# Patient Record
Sex: FEMALE | Race: BLACK OR AFRICAN AMERICAN | NOT HISPANIC OR LATINO | Employment: UNEMPLOYED | URBAN - METROPOLITAN AREA
[De-identification: names, ages, dates, MRNs, and addresses within clinical notes are randomized per-mention and may not be internally consistent; named-entity substitution may affect disease eponyms.]

---

## 2018-03-06 ENCOUNTER — OFFICE VISIT (OUTPATIENT)
Dept: URGENT CARE | Facility: CLINIC | Age: 68
End: 2018-03-06
Payer: MEDICARE

## 2018-03-06 VITALS
OXYGEN SATURATION: 97 % | DIASTOLIC BLOOD PRESSURE: 71 MMHG | TEMPERATURE: 98 F | WEIGHT: 208 LBS | SYSTOLIC BLOOD PRESSURE: 141 MMHG | HEART RATE: 78 BPM

## 2018-03-06 DIAGNOSIS — M54.9 BACK PAIN, UNSPECIFIED BACK LOCATION, UNSPECIFIED BACK PAIN LATERALITY, UNSPECIFIED CHRONICITY: Primary | ICD-10-CM

## 2018-03-06 PROCEDURE — 96372 THER/PROPH/DIAG INJ SC/IM: CPT | Mod: S$GLB,,, | Performed by: EMERGENCY MEDICINE

## 2018-03-06 PROCEDURE — 99203 OFFICE O/P NEW LOW 30 MIN: CPT | Mod: 25,S$GLB,, | Performed by: PHYSICIAN ASSISTANT

## 2018-03-06 RX ORDER — GABAPENTIN 100 MG/1
300 CAPSULE ORAL 3 TIMES DAILY
COMMUNITY
End: 2018-03-06 | Stop reason: SDUPTHER

## 2018-03-06 RX ORDER — DICLOFENAC SODIUM 10 MG/G
2 GEL TOPICAL DAILY
COMMUNITY

## 2018-03-06 RX ORDER — SIMVASTATIN 40 MG/1
40 TABLET, FILM COATED ORAL NIGHTLY
COMMUNITY
End: 2020-03-26 | Stop reason: ALTCHOICE

## 2018-03-06 RX ORDER — ZONISAMIDE 100 MG/1
CAPSULE ORAL
COMMUNITY

## 2018-03-06 RX ORDER — ALENDRONATE SODIUM 70 MG/1
70 TABLET ORAL
COMMUNITY

## 2018-03-06 RX ORDER — ASPIRIN 81 MG/1
81 TABLET ORAL DAILY
COMMUNITY

## 2018-03-06 RX ORDER — DICLOFENAC SODIUM 1 MG/ML
1 SOLUTION/ DROPS OPHTHALMIC 4 TIMES DAILY
COMMUNITY
End: 2020-03-26 | Stop reason: ALTCHOICE

## 2018-03-06 RX ORDER — GABAPENTIN 300 MG/1
300 CAPSULE ORAL 3 TIMES DAILY
Refills: 2 | COMMUNITY
Start: 2018-01-25

## 2018-03-06 RX ORDER — ESOMEPRAZOLE MAGNESIUM 40 MG/1
40 CAPSULE, DELAYED RELEASE ORAL
COMMUNITY

## 2018-03-06 RX ORDER — BENAZEPRIL HYDROCHLORIDE AND HYDROCHLOROTHIAZIDE 10; 12.5 MG/1; MG/1
1 TABLET ORAL DAILY
COMMUNITY
End: 2020-03-26 | Stop reason: ALTCHOICE

## 2018-03-06 RX ORDER — TROSPIUM CHLORIDE ER 60 MG/1
60 CAPSULE ORAL DAILY
COMMUNITY
End: 2020-03-26 | Stop reason: ALTCHOICE

## 2018-03-06 RX ORDER — HYDROCODONE BITARTRATE AND ACETAMINOPHEN 5; 325 MG/1; MG/1
1 TABLET ORAL EVERY 6 HOURS PRN
COMMUNITY

## 2018-03-06 RX ORDER — AZELASTINE HYDROCHLORIDE 0.5 MG/ML
1 SOLUTION/ DROPS OPHTHALMIC 2 TIMES DAILY
COMMUNITY

## 2018-03-06 NOTE — PROGRESS NOTES
Subjective:       Patient ID: Daksha Collins is a 67 y.o. female.    Vitals:  weight is 94.3 kg (208 lb). Her temperature is 97.7 °F (36.5 °C). Her blood pressure is 141/71 (abnormal) and her pulse is 78. Her oxygen saturation is 97%.     Chief Complaint: Back Pain    Patient is using OTC pain patches. Hydrocodone is not helping. Patient states that she picked up a gallon of water and pulled her back. Patient is allergic to tramadol.        Back Pain   This is a new problem. The current episode started in the past 7 days. The problem occurs constantly. The problem is unchanged. The pain is present in the lumbar spine and gluteal. The quality of the pain is described as stabbing. The pain does not radiate. The pain is at a severity of 8/10. The pain is moderate. The pain is worse during the night (pain woke patient up during the middle of the night). The symptoms are aggravated by bending. Pertinent negatives include no abdominal pain, chest pain, fever or headaches. She has tried analgesics, heat and ice for the symptoms. The treatment provided no relief.     Review of Systems   Constitution: Negative for chills and fever.   HENT: Negative for sore throat.    Eyes: Negative for blurred vision.   Cardiovascular: Negative for chest pain.   Respiratory: Negative for shortness of breath.    Skin: Negative for rash.   Musculoskeletal: Positive for back pain. Negative for joint pain.        Patient reports constant sharp, stabbing pain in lower lumber region. Patient reports pain in worse on the right side and radiates down her hip. She has an appointment with her PCP for pain management on 3/14/18. Patient was advised to come to urgent care to be treated since no earlier appointment could be offered.     Patient had [place] 9[place]   Gastrointestinal: Negative for abdominal pain, diarrhea, nausea and vomiting.   Neurological: Negative for headaches.   Psychiatric/Behavioral: The patient is not  nervous/anxious.        Objective:      Physical Exam   Constitutional: She is oriented to person, place, and time. Vital signs are normal. She appears well-developed and well-nourished. She is active and cooperative. No distress.   HENT:   Head: Normocephalic and atraumatic.   Nose: Nose normal.   Mouth/Throat: Oropharynx is clear and moist and mucous membranes are normal.   Eyes: Conjunctivae and lids are normal.   Neck: Trachea normal, normal range of motion, full passive range of motion without pain and phonation normal. Neck supple.   Cardiovascular: Normal rate, regular rhythm, normal heart sounds, intact distal pulses and normal pulses.    Pulmonary/Chest: Effort normal and breath sounds normal.   Abdominal: Soft. Normal appearance and bowel sounds are normal. She exhibits no abdominal bruit, no pulsatile midline mass and no mass.   Musculoskeletal: She exhibits no edema.        Lumbar back: She exhibits decreased range of motion, tenderness and pain. She exhibits no bony tenderness, no deformity and no laceration.        Back:    Neurological: She is alert and oriented to person, place, and time. She has normal strength and normal reflexes. No sensory deficit.   Skin: Skin is warm, dry and intact. She is not diaphoretic.   Psychiatric: She has a normal mood and affect. Her speech is normal and behavior is normal. Judgment and thought content normal. Cognition and memory are normal.   Nursing note and vitals reviewed.      Assessment:       1. Back pain, unspecified back location, unspecified back pain laterality, unspecified chronicity        Plan:         Back pain, unspecified back location, unspecified back pain laterality, unspecified chronicity  -     Discontinue: methylPREDNISolone sod suc(PF) injection 125 mg; Inject 125 mg into the vein one time.  -     methylPREDNISolone sod suc(PF) injection 125 mg; Inject 125 mg into the muscle once.        Please drink plenty of fluids.  Please get plenty of  rest.  Please return here or go to the Emergency Department for any concerns or worsening of condition.  If you were prescribed a narcotic medication, do not drive or operate heavy equipment or machinery while taking these medications.  If you were not prescribed an anti-inflammatory medication, and if you do not have any history of stomach/intestinal ulcers, or kidney disease, or are not taking a blood thinner such as Coumadin, Plavix, Pradaxa, Eloquis, or Xaralta for example, it is OK to take over the counter Ibuprofen or Advil or Motrin or Aleve as directed.  Do not take these medications on an empty stomach.  If you lose control of your bowel and/or bladder, please go to the nearest Emergency Department immediately.  If you lose sensation in between your legs by your genitalia and/or rectum, please go to the nearest Emergency Department immediately.  If you lose control or sensation of any extremity, please go to the nearest Emergency Department immediately.  Please follow up with your primary care doctor or specialist as needed.    If you  smoke, please stop smoking.        Back Pain (Acute or Chronic)    Back pain is one of the most common problems. The good news is that most people feel better in 1 to 2 weeks, and most of the rest in 1 to 2 months. Most people can remain active.  People experience and describe pain differently; not everyone is the same.  · The pain can be sharp, stabbing, shooting, aching, cramping or burning.  · Movement, standing, bending, lifting, sitting, or walking may worsen pain.  · It can be localized to one spot or area, or it can be more generalized.  · It can spread or radiate upwards, to the front, or go down your arms or legs (sciatica).  · It can cause muscle spasm.  Most of the time, mechanical problems with the muscles or spine cause the pain. Mechanical problems are usually caused by an injury to the muscles or ligaments. While illness can cause back pain, it is usually not  caused by a serious illness. Mechanical problems include:   · Physical activity such as sports, exercise, work, or normal activity  · Overexertion, lifting, pushing, pulling incorrectly or too aggressively  · Sudden twisting, bending, or stretching from an accident, or accidental movement  · Poor posture  · Stretching or moving wrong, without noticing pain at the time  · Poor coordination, lack of regular exercise (check with your doctor about this)  · Spinal disc disease or arthritis  · Stress  Pain can also be related to pregnancy, or illness like appendicitis, bladder or kidney infections, pelvic infections, and many other things.  Acute back pain usually gets better in 1 to 2 weeks. Back pain related to disk disease, arthritis in the spinal joints or spinal stenosis (narrowing of the spinal canal) can become chronic and last for months or years.  Unless you had a physical injury (for example, a car accident or fall) X-rays are usually not needed for the initial evaluation of back pain. If pain continues and does not respond to medical treatment, X-rays and other tests may be needed.  Home care  Try these home care recommendations:  · When in bed, try to find a position of comfort. A firm mattress is best. Try lying flat on your back with pillows under your knees. You can also try lying on your side with your knees bent up towards your chest and a pillow between your knees.  · At first, do not try to stretch out the sore spots. If there is a strain, it is not like the good soreness you get after exercising without an injury. In this case, stretching may make it worse.  · Avoid prolong sitting, long car rides, or travel. This puts more stress on the lower back than standing or walking.  · During the first 24 to 72 hours after an acute injury or flare up of chronic back pain, apply an ice pack to the painful area for 20 minutes and then remove it for 20 minutes. Do this over a period of 60 to 90 minutes or several  times a day. This will reduce swelling and pain. Wrap the ice pack in a thin towel or plastic to protect your skin.  · You can start with ice, then switch to heat. Heat (hot shower, hot bath, or heating pad) reduces pain and works well for muscle spasms. Heat can be applied to the painful area for 20 minutes then remove it for 20 minutes. Do this over a period of 60 to 90 minutes or several times a day. Do not sleep on a heating pad. It can lead to skin burns or tissue damage.  · You can alternate ice and heat therapy. Talk with your doctor about the best treatment for your back pain.  · Therapeutic massage can help relax the back muscles without stretching them.  · Be aware of safe lifting methods and do not lift anything without stretching first.  Medicines  Talk to your doctor before using medicine, especially if you have other medical problems or are taking other medicines.  · You may use over-the-counter medicine as directed on the bottle to control pain, unless another pain medicine was prescribed. If you have chronic conditions like diabetes, liver or kidney disease, stomach ulcers, or gastrointestinal bleeding, or are taking blood thinners, talk to your doctor before taking any medicine.  · Be careful if you are given a prescription medicines, narcotics, or medicine for muscle spasms. They can cause drowsiness, affect your coordination, reflexes, and judgement. Do not drive or operate heavy machinery.  Follow-up care  Follow up with your healthcare provider, or as advised.   A radiologist will review any X-rays that were taken. Your provide will notify you of any new findings that may affect your care.  Call 911  Call emergency services if any of the following occur:  · Trouble breathing  · Confusion  · Very drowsy or trouble awakening  · Fainting or loss of consciousness  · Rapid or very slow heart rate  · Loss of bowel or bladder control  When to seek medical advice  Call your healthcare provider right  away if any of these occur:   · Pain becomes worse or spreads to your legs  · Weakness or numbness in one or both legs  · Numbness in the groin or genital area  Date Last Reviewed: 7/1/2016 © 2000-2017 [employer]. [address] [place] [id number]. All rights reserved. This information is not intended as a substitute for professional medical care. Always follow your healthcare professional's instructions.        Relieving Back Pain  Back pain is a common problem. You can strain back muscles by lifting too much weight or just by moving the wrong way. Back strain can be uncomfortable, even painful. And it can take weeks or months to improve. To help yourself feel better and prevent future back strains, try these tips.  Important Note: Do not give aspirin to children or teens without first discussing it with your healthcare provider.      ? Ice    Ice reduces muscle pain and swelling. It helps most during the first 24 to 48 hours after an injury.  · Wrap an ice pack or a bag of frozen peas in a thin towel. (Never place ice directly on your skin.)  · Place the ice where your back hurts the most.  · Dont ice for more than 20 minutes at a time.  · You can use ice several times a day.  ? Medicines  Over-the-counter pain relievers can include acetaminophen and anti-inflammatory medicines, which includes aspirin or ibuprofen. They can help ease discomfort. Some also reduce swelling.  · Tell your healthcare provider about any medicines you are already taking.  · Take medicines only as directed.  ? Heat  After the first 48 hours, heat can relax sore muscles and improve blood flow.  · Try a warm bath or shower. Or use a heating pad set on low. To prevent a burn, keep a cloth between you and the heating pad.  · Dont use a heating pad for more than 15 minutes at a time. Never sleep on a heating pad.  Date Last Reviewed: 9/1/2015  © 7770-0324 [employer]. [address] [place] [address]  [address]. All rights reserved. This information is not intended as a substitute for professional medical care. Always follow your healthcare professional's instructions.

## 2018-03-06 NOTE — PATIENT INSTRUCTIONS
Please drink plenty of fluids.  Please get plenty of rest.  Please return here or go to the Emergency Department for any concerns or worsening of condition.  If you were prescribed a narcotic medication, do not drive or operate heavy equipment or machinery while taking these medications.  If you were not prescribed an anti-inflammatory medication, and if you do not have any history of stomach/intestinal ulcers, or kidney disease, or are not taking a blood thinner such as Coumadin, Plavix, Pradaxa, Eloquis, or Xaralta for example, it is OK to take over the counter Ibuprofen or Advil or Motrin or Aleve as directed.  Do not take these medications on an empty stomach.  If you lose control of your bowel and/or bladder, please go to the nearest Emergency Department immediately.  If you lose sensation in between your legs by your genitalia and/or rectum, please go to the nearest Emergency Department immediately.  If you lose control or sensation of any extremity, please go to the nearest Emergency Department immediately.  Please follow up with your primary care doctor or specialist as needed.    If you  smoke, please stop smoking.        Back Pain (Acute or Chronic)    Back pain is one of the most common problems. The good news is that most people feel better in 1 to 2 weeks, and most of the rest in 1 to 2 months. Most people can remain active.  People experience and describe pain differently; not everyone is the same.  · The pain can be sharp, stabbing, shooting, aching, cramping or burning.  · Movement, standing, bending, lifting, sitting, or walking may worsen pain.  · It can be localized to one spot or area, or it can be more generalized.  · It can spread or radiate upwards, to the front, or go down your arms or legs (sciatica).  · It can cause muscle spasm.  Most of the time, mechanical problems with the muscles or spine cause the pain. Mechanical problems are usually caused by an injury to the muscles or ligaments.  While illness can cause back pain, it is usually not caused by a serious illness. Mechanical problems include:   · Physical activity such as sports, exercise, work, or normal activity  · Overexertion, lifting, pushing, pulling incorrectly or too aggressively  · Sudden twisting, bending, or stretching from an accident, or accidental movement  · Poor posture  · Stretching or moving wrong, without noticing pain at the time  · Poor coordination, lack of regular exercise (check with your doctor about this)  · Spinal disc disease or arthritis  · Stress  Pain can also be related to pregnancy, or illness like appendicitis, bladder or kidney infections, pelvic infections, and many other things.  Acute back pain usually gets better in 1 to 2 weeks. Back pain related to disk disease, arthritis in the spinal joints or spinal stenosis (narrowing of the spinal canal) can become chronic and last for months or years.  Unless you had a physical injury (for example, a car accident or fall) X-rays are usually not needed for the initial evaluation of back pain. If pain continues and does not respond to medical treatment, X-rays and other tests may be needed.  Home care  Try these home care recommendations:  · When in bed, try to find a position of comfort. A firm mattress is best. Try lying flat on your back with pillows under your knees. You can also try lying on your side with your knees bent up towards your chest and a pillow between your knees.  · At first, do not try to stretch out the sore spots. If there is a strain, it is not like the good soreness you get after exercising without an injury. In this case, stretching may make it worse.  · Avoid prolong sitting, long car rides, or travel. This puts more stress on the lower back than standing or walking.  · During the first 24 to 72 hours after an acute injury or flare up of chronic back pain, apply an ice pack to the painful area for 20 minutes and then remove it for 20 minutes.  Do this over a period of 60 to 90 minutes or several times a day. This will reduce swelling and pain. Wrap the ice pack in a thin towel or plastic to protect your skin.  · You can start with ice, then switch to heat. Heat (hot shower, hot bath, or heating pad) reduces pain and works well for muscle spasms. Heat can be applied to the painful area for 20 minutes then remove it for 20 minutes. Do this over a period of 60 to 90 minutes or several times a day. Do not sleep on a heating pad. It can lead to skin burns or tissue damage.  · You can alternate ice and heat therapy. Talk with your doctor about the best treatment for your back pain.  · Therapeutic massage can help relax the back muscles without stretching them.  · Be aware of safe lifting methods and do not lift anything without stretching first.  Medicines  Talk to your doctor before using medicine, especially if you have other medical problems or are taking other medicines.  · You may use over-the-counter medicine as directed on the bottle to control pain, unless another pain medicine was prescribed. If you have chronic conditions like diabetes, liver or kidney disease, stomach ulcers, or gastrointestinal bleeding, or are taking blood thinners, talk to your doctor before taking any medicine.  · Be careful if you are given a prescription medicines, narcotics, or medicine for muscle spasms. They can cause drowsiness, affect your coordination, reflexes, and judgement. Do not drive or operate heavy machinery.  Follow-up care  Follow up with your healthcare provider, or as advised.   A radiologist will review any X-rays that were taken. Your provide will notify you of any new findings that may affect your care.  Call 911  Call emergency services if any of the following occur:  · Trouble breathing  · Confusion  · Very drowsy or trouble awakening  · Fainting or loss of consciousness  · Rapid or very slow heart rate  · Loss of bowel or bladder control  When to seek  medical advice  Call your healthcare provider right away if any of these occur:   · Pain becomes worse or spreads to your legs  · Weakness or numbness in one or both legs  · Numbness in the groin or genital area  Date Last Reviewed: 7/1/2016 © 2000-2017 [employer]. [address] [place] [id number]. All rights reserved. This information is not intended as a substitute for professional medical care. Always follow your healthcare professional's instructions.        Relieving Back Pain  Back pain is a common problem. You can strain back muscles by lifting too much weight or just by moving the wrong way. Back strain can be uncomfortable, even painful. And it can take weeks or months to improve. To help yourself feel better and prevent future back strains, try these tips.  Important Note: Do not give aspirin to children or teens without first discussing it with your healthcare provider.      ? Ice    Ice reduces muscle pain and swelling. It helps most during the first 24 to 48 hours after an injury.  · Wrap an ice pack or a bag of frozen peas in a thin towel. (Never place ice directly on your skin.)  · Place the ice where your back hurts the most.  · Dont ice for more than 20 minutes at a time.  · You can use ice several times a day.  ? Medicines  Over-the-counter pain relievers can include acetaminophen and anti-inflammatory medicines, which includes aspirin or ibuprofen. They can help ease discomfort. Some also reduce swelling.  · Tell your healthcare provider about any medicines you are already taking.  · Take medicines only as directed.  ? Heat  After the first 48 hours, heat can relax sore muscles and improve blood flow.  · Try a warm bath or shower. Or use a heating pad set on low. To prevent a burn, keep a cloth between you and the heating pad.  · Dont use a heating pad for more than 15 minutes at a time. Never sleep on a heating pad.  Date Last Reviewed: 9/1/2015 © 2000-2017 [employer]  [employer]. [address] [place] [address], [place] [address]. All rights reserved. This information is not intended as a substitute for professional medical care. Always follow your healthcare professional's instructions.

## 2018-03-09 ENCOUNTER — TELEPHONE (OUTPATIENT)
Dept: URGENT CARE | Facility: CLINIC | Age: 68
End: 2018-03-09

## 2020-03-26 ENCOUNTER — OFFICE VISIT (OUTPATIENT)
Dept: URGENT CARE | Facility: CLINIC | Age: 70
End: 2020-03-26
Payer: MEDICARE

## 2020-03-26 VITALS
RESPIRATION RATE: 18 BRPM | DIASTOLIC BLOOD PRESSURE: 90 MMHG | OXYGEN SATURATION: 98 % | SYSTOLIC BLOOD PRESSURE: 140 MMHG | TEMPERATURE: 98 F | HEART RATE: 80 BPM

## 2020-03-26 DIAGNOSIS — Z78.9: ICD-10-CM

## 2020-03-26 DIAGNOSIS — B85.1 BODY LICE: ICD-10-CM

## 2020-03-26 DIAGNOSIS — B85.0 HEAD LICE: Primary | ICD-10-CM

## 2020-03-26 PROCEDURE — 99214 OFFICE O/P EST MOD 30 MIN: CPT | Mod: S$GLB,,, | Performed by: FAMILY MEDICINE

## 2020-03-26 PROCEDURE — 99214 PR OFFICE/OUTPT VISIT, EST, LEVL IV, 30-39 MIN: ICD-10-PCS | Mod: S$GLB,,, | Performed by: FAMILY MEDICINE

## 2020-03-26 RX ORDER — HYDROCHLOROTHIAZIDE 12.5 MG/1
12.5 TABLET ORAL DAILY
COMMUNITY

## 2020-03-26 NOTE — PATIENT INSTRUCTIONS
Head Lice     Head lice can spread quickly in schools and  centers.     Lice are very tiny insects. They like to live in hair, so they are often called head lice. An infection with head lice is very common in school-age children, but anyone can get lice. Head lice do not live on pets and cannot jump, fly, or walk on the ground. But they easily pass from child to child through close contact and on clothes, bed linens, brushes and arias, hats, and toys. Head lice infections are not dangerous, but they can be difficult to treat sometimes. They are very contagious and should be treated right away to stop infection from spreading.  Symptoms of Head Lice  Lice are so small and fast-moving that they are hard to see. Head lice lay eggs, called nits. Nits are very small, silvery white, and teardrop shaped. They often can be found stuck to the hair near the scalp, behind the ears, and at the hairline on the back of the neck. Other signs of head lice may include:  · Itching of the scalp and scalp irritation.  · A tickling feeling of something moving through the hair.  · Sores on the scalp caused by scratching.  · Swollen glands at the back of the neck caused by infected bites.  Treating Head Lice  · Ask your healthcare provider or pharmacist to recommend a shampoo, cream, or lotion to stop lice infestation. Follow the instructions on the product for how to use it.  · Comb the nits out of your childs hair with a special comb that comes with the product or is recommended by your healthcare provider or pharmacist. Rinsing the hair with distilled white vinegar can make nits easier to see.  · After a week, check the child for more nits. Follow the products directions and ask your healthcare provider about the need for repeating treatment.  · Check other household members for lice.  · Wash your childs towels, clothing, bed linens, cloth toys, and other personal items in hot soapy water. Dry them on high heat.  · Wash  all the childs arias and brushes in very hot, soapy water.  · For items that cant be washed, seal them in plastic bags for 2 weeks.  · Vacuum floors and furniture. Throw the vacuum bag away afterward.  · Notify your childs school and caregivers so that other children can be checked.  · Keep your child home from  or school until the morning after treatment for lice.  · Do not spray your house with chemicals or pesticides. These can be dangerous to your familys health.  When to call the healthcare provider  Do not treat your child for head lice unless you are sure the child has them. Because lice are insects, most products to get rid of them have pesticides in them. You should not expose your child to these chemicals unless it is necessary since they can cause skin and eye irritation. Call your childs healthcare provider if:  · Youre not sure whether your child has lice.  · Your child is younger than age 2.  · Treatment doesnt get rid of the lice.  · Your child has infected sores that get worse or dont heal.  · Your child is itchy or scratching in areas other than the scalp.  · You have questions about your childs illness or treatment.  Prevention  To help prevent the spread of head lice:  · Warn your children not to share brushes, hats, and clothes with other children.  · Have your child avoid physical contact with anyone who has head lice until after the person has been treated.  · Examine your child when he or she has come in close contact with a person infected with lice.  Note: It may take up to a week after treatment for your childs itching to stop. Your healthcare provider may recommend that you repeat treatment a week later, but your child can return to school or  the day after treatment.   Date Last Reviewed: 8/1/2016  © 3437-6039 [employer]. [address] [place] [address], [place] [address]. All rights reserved. This information is not intended as a substitute for professional  medical care. Always follow your healthcare professional's instructions.        Head Lice     Head lice can spread quickly in schools and  centers.     Lice are very tiny insects. They like to live in hair, so they are often called head lice. An infection with head lice is very common in school-age children, but anyone can get lice. Head lice do not live on pets and cannot jump, fly, or walk on the ground. But they easily pass from child to child through close contact and on clothes, bed linens, brushes and arias, hats, and toys. Head lice infections are not dangerous, but they can be difficult to treat sometimes. They are very contagious and should be treated right away to stop infection from spreading.  Symptoms of Head Lice  Lice are so small and fast-moving that they are hard to see. Head lice lay eggs, called nits. Nits are very small, silvery white, and teardrop shaped. They often can be found stuck to the hair near the scalp, behind the ears, and at the hairline on the back of the neck. Other signs of head lice may include:  · Itching of the scalp and scalp irritation.  · A tickling feeling of something moving through the hair.  · Sores on the scalp caused by scratching.  · Swollen glands at the back of the neck caused by infected bites.  Treating Head Lice  · Ask your healthcare provider or pharmacist to recommend a shampoo, cream, or lotion to stop lice infestation. Follow the instructions on the product for how to use it.  · Comb the nits out of your childs hair with a special comb that comes with the product or is recommended by your healthcare provider or pharmacist. Rinsing the hair with distilled white vinegar can make nits easier to see.  · After a week, check the child for more nits. Follow the products directions and ask your healthcare provider about the need for repeating treatment.  · Check other household members for lice.  · Wash your childs towels, clothing, bed linens, cloth toys,  and other personal items in hot soapy water. Dry them on high heat.  · Wash all the childs arias and brushes in very hot, soapy water.  · For items that cant be washed, seal them in plastic bags for 2 weeks.  · Vacuum floors and furniture. Throw the vacuum bag away afterward.  · Notify your childs school and caregivers so that other children can be checked.  · Keep your child home from  or school until the morning after treatment for lice.  · Do not spray your house with chemicals or pesticides. These can be dangerous to your familys health.  When to call the healthcare provider  Do not treat your child for head lice unless you are sure the child has them. Because lice are insects, most products to get rid of them have pesticides in them. You should not expose your child to these chemicals unless it is necessary since they can cause skin and eye irritation. Call your childs healthcare provider if:  · Youre not sure whether your child has lice.  · Your child is younger than age 2.  · Treatment doesnt get rid of the lice.  · Your child has infected sores that get worse or dont heal.  · Your child is itchy or scratching in areas other than the scalp.  · You have questions about your childs illness or treatment.  Prevention  To help prevent the spread of head lice:  · Warn your children not to share brushes, hats, and clothes with other children.  · Have your child avoid physical contact with anyone who has head lice until after the person has been treated.  · Examine your child when he or she has come in close contact with a person infected with lice.  Note: It may take up to a week after treatment for your childs itching to stop. Your healthcare provider may recommend that you repeat treatment a week later, but your child can return to school or  the day after treatment.   Date Last Reviewed: 8/1/2016  © 7614-1810 [employer]. [address] [place] [address], [place] [address]. All rights  reserved. This information is not intended as a substitute for professional medical care. Always follow your healthcare professional's instructions.

## 2020-03-26 NOTE — PROGRESS NOTES
"Subjective:       Patient ID: Daksha Collins is a 69 y.o. female.    Vitals:  tympanic temperature is 97.9 °F (36.6 °C). Her blood pressure is 140/90 (abnormal) and her pulse is 80. Her respiration is 18 and oxygen saturation is 98%.     Chief Complaint: Insect Bite    Patient presents with c.o bug bites for 3 weeks.   Patient brought in a container full of small objects (unsure if they are bugs).     Patient states she itches all over. No meds tried otc.    Denies sob, fever, no recent travel and no known exp to corona. Patient reports a chronic cough for the last year which her doctor has given her many inhalers to manage symptom.   She has had the maintenance people come to her apartment where she states the bugs are "flying around"        Insect Bite   This is a new problem. Episode onset: 3 weeks. The problem occurs constantly. Associated symptoms include a rash. Pertinent negatives include no arthralgias, chills, coughing, fever, joint swelling or sore throat. Nothing aggravates the symptoms. She has tried nothing for the symptoms.       Constitution: Negative for chills and fever.   HENT: Negative for facial swelling and sore throat.    Neck: Negative for painful lymph nodes.   Eyes: Negative for eye itching and eyelid swelling.   Respiratory: Negative for cough.    Musculoskeletal: Negative for joint pain and joint swelling.   Skin: Positive for rash. Negative for color change, pale, wound, abrasion, laceration, lesion, skin thickening/induration, puncture wound, erythema, bruising, abscess, avulsion and hives.   Allergic/Immunologic: Positive for itching. Negative for environmental allergies, immunocompromised state and hives.   Hematologic/Lymphatic: Negative for swollen lymph nodes.       Objective:      Physical Exam   Constitutional: She is oriented to person, place, and time. She appears well-developed and well-nourished.   Neurological: She is alert and oriented to person, place, and time.   Skin: " "Skin is warm and dry.   No obvious excoriations.   Pt has small white flex in her scalp -? Head lice.    the one white bug she points out on her walker -chair she is able to  and setdown on a table and   Crunch" with her magnifying mirror erythema  Psychiatric: She has a normal mood and affect.   Nursing note and vitals reviewed.        Assessment:       1. Head lice    2. Body lice    3. Unknown if patient has history of psychiatric disorder        Plan:         Head lice  -     Discontinue: permethrin (LICE TREATMENT, PERMETHRIN,) 1 % liquid; Apply topically once. for 1 dose; Refill: 0  -     permethrin (LICE TREATMENT, PERMETHRIN,) 1 % liquid; Apply topically once. for 1 dose; Refill: 0    Body lice  -     crotamiton (CROTAN) 10 % cream; Apply topically once daily.  Dispense: 60 g; Refill: 1    Unknown if patient has history of psychiatric disorder    Other orders  -     Discontinue: crotamiton (CROTAN) 10 % cream; Apply topically once daily.  Dispense: 60 g; Refill: 1       Patient Instructions       Head Lice     Head lice can spread quickly in schools and  centers.     Lice are very tiny insects. They like to live in hair, so they are often called head lice. An infection with head lice is very common in school-age children, but anyone can get lice. Head lice do not live on pets and cannot jump, fly, or walk on the ground. But they easily pass from child to child through close contact and on clothes, bed linens, brushes and arias, hats, and toys. Head lice infections are not dangerous, but they can be difficult to treat sometimes. They are very contagious and should be treated right away to stop infection from spreading.  Symptoms of Head Lice  Lice are so small and fast-moving that they are hard to see. Head lice lay eggs, called nits. Nits are very small, silvery white, and teardrop shaped. They often can be found stuck to the hair near the scalp, behind the ears, and at the hairline on the " back of the neck. Other signs of head lice may include:  · Itching of the scalp and scalp irritation.  · A tickling feeling of something moving through the hair.  · Sores on the scalp caused by scratching.  · Swollen glands at the back of the neck caused by infected bites.  Treating Head Lice  · Ask your healthcare provider or pharmacist to recommend a shampoo, cream, or lotion to stop lice infestation. Follow the instructions on the product for how to use it.  · Comb the nits out of your childs hair with a special comb that comes with the product or is recommended by your healthcare provider or pharmacist. Rinsing the hair with distilled white vinegar can make nits easier to see.  · After a week, check the child for more nits. Follow the products directions and ask your healthcare provider about the need for repeating treatment.  · Check other household members for lice.  · Wash your childs towels, clothing, bed linens, cloth toys, and other personal items in hot soapy water. Dry them on high heat.  · Wash all the childs arias and brushes in very hot, soapy water.  · For items that cant be washed, seal them in plastic bags for 2 weeks.  · Vacuum floors and furniture. Throw the vacuum bag away afterward.  · Notify your childs school and caregivers so that other children can be checked.  · Keep your child home from  or school until the morning after treatment for lice.  · Do not spray your house with chemicals or pesticides. These can be dangerous to your familys health.  When to call the healthcare provider  Do not treat your child for head lice unless you are sure the child has them. Because lice are insects, most products to get rid of them have pesticides in them. You should not expose your child to these chemicals unless it is necessary since they can cause skin and eye irritation. Call your childs healthcare provider if:  · Youre not sure whether your child has lice.  · Your child is younger  than age 2.  · Treatment doesnt get rid of the lice.  · Your child has infected sores that get worse or dont heal.  · Your child is itchy or scratching in areas other than the scalp.  · You have questions about your childs illness or treatment.  Prevention  To help prevent the spread of head lice:  · Warn your children not to share brushes, hats, and clothes with other children.  · Have your child avoid physical contact with anyone who has head lice until after the person has been treated.  · Examine your child when he or she has come in close contact with a person infected with lice.  Note: It may take up to a week after treatment for your childs itching to stop. Your healthcare provider may recommend that you repeat treatment a week later, but your child can return to school or  the day after treatment.   Date Last Reviewed: 8/1/2016  © 1282-9239 [employer]. [address] [place] [address]. All rights reserved. This information is not intended as a substitute for professional medical care. Always follow your healthcare professional's instructions.        Head Lice     Head lice can spread quickly in schools and  centers.     Lice are very tiny insects. They like to live in hair, so they are often called head lice. An infection with head lice is very common in school-age children, but anyone can get lice. Head lice do not live on pets and cannot jump, fly, or walk on the ground. But they easily pass from child to child through close contact and on clothes, bed linens, brushes and arias, hats, and toys. Head lice infections are not dangerous, but they can be difficult to treat sometimes. They are very contagious and should be treated right away to stop infection from spreading.  Symptoms of Head Lice  Lice are so small and fast-moving that they are hard to see. Head lice lay eggs, called nits. Nits are very small, silvery white, and teardrop shaped. They often can be  found stuck to the hair near the scalp, behind the ears, and at the hairline on the back of the neck. Other signs of head lice may include:  · Itching of the scalp and scalp irritation.  · A tickling feeling of something moving through the hair.  · Sores on the scalp caused by scratching.  · Swollen glands at the back of the neck caused by infected bites.  Treating Head Lice  · Ask your healthcare provider or pharmacist to recommend a shampoo, cream, or lotion to stop lice infestation. Follow the instructions on the product for how to use it.  · Comb the nits out of your childs hair with a special comb that comes with the product or is recommended by your healthcare provider or pharmacist. Rinsing the hair with distilled white vinegar can make nits easier to see.  · After a week, check the child for more nits. Follow the products directions and ask your healthcare provider about the need for repeating treatment.  · Check other household members for lice.  · Wash your childs towels, clothing, bed linens, cloth toys, and other personal items in hot soapy water. Dry them on high heat.  · Wash all the childs arias and brushes in very hot, soapy water.  · For items that cant be washed, seal them in plastic bags for 2 weeks.  · Vacuum floors and furniture. Throw the vacuum bag away afterward.  · Notify your childs school and caregivers so that other children can be checked.  · Keep your child home from  or school until the morning after treatment for lice.  · Do not spray your house with chemicals or pesticides. These can be dangerous to your familys health.  When to call the healthcare provider  Do not treat your child for head lice unless you are sure the child has them. Because lice are insects, most products to get rid of them have pesticides in them. You should not expose your child to these chemicals unless it is necessary since they can cause skin and eye irritation. Call your childs healthcare  provider if:  · Youre not sure whether your child has lice.  · Your child is younger than age 2.  · Treatment doesnt get rid of the lice.  · Your child has infected sores that get worse or dont heal.  · Your child is itchy or scratching in areas other than the scalp.  · You have questions about your childs illness or treatment.  Prevention  To help prevent the spread of head lice:  · Warn your children not to share brushes, hats, and clothes with other children.  · Have your child avoid physical contact with anyone who has head lice until after the person has been treated.  · Examine your child when he or she has come in close contact with a person infected with lice.  Note: It may take up to a week after treatment for your childs itching to stop. Your healthcare provider may recommend that you repeat treatment a week later, but your child can return to school or  the day after treatment.   Date Last Reviewed: 8/1/2016 © 2000-2017 [employer]. [address] [place] [address], [place] [address]. All rights reserved. This information is not intended as a substitute for professional medical care. Always follow your healthcare professional's instructions.

## 2020-04-02 ENCOUNTER — TELEPHONE (OUTPATIENT)
Dept: URGENT CARE | Facility: CLINIC | Age: 70
End: 2020-04-02

## 2020-04-02 DIAGNOSIS — B85.1 BODY LICE: Primary | ICD-10-CM

## 2020-04-02 RX ORDER — PERMETHRIN 50 MG/G
CREAM TOPICAL ONCE
Qty: 60 G | Refills: 0 | Status: SHIPPED | OUTPATIENT
Start: 2020-04-02 | End: 2020-04-02

## 2020-04-02 NOTE — TELEPHONE ENCOUNTER
Patient called stating that the pharmacy doesn't have her medications. States that she was seen last week and told to get Nix and her Rx. She got her Nix but she went to the pharmacy and they said they were unable to order her medication (crotamiton). Requesting alternative treatment. Permethrin 5% lotion sent to pharmacy.

## 2020-04-06 ENCOUNTER — TELEPHONE (OUTPATIENT)
Dept: URGENT CARE | Facility: CLINIC | Age: 70
End: 2020-04-06

## 2020-04-06 ENCOUNTER — HOSPITAL ENCOUNTER (EMERGENCY)
Facility: HOSPITAL | Age: 70
Discharge: PSYCHIATRIC OR SUBSTANCE USE FACILITY | End: 2020-04-07
Attending: EMERGENCY MEDICINE
Payer: MEDICARE

## 2020-04-06 DIAGNOSIS — F22: ICD-10-CM

## 2020-04-06 DIAGNOSIS — R45.851 SUICIDAL IDEATIONS: Primary | ICD-10-CM

## 2020-04-06 LAB
ALBUMIN SERPL BCP-MCNC: 4 G/DL (ref 3.5–5.2)
ALP SERPL-CCNC: 74 U/L (ref 55–135)
ALT SERPL W/O P-5'-P-CCNC: 25 U/L (ref 10–44)
AMORPH CRY UR QL COMP ASSIST: ABNORMAL
AMPHET+METHAMPHET UR QL: NEGATIVE
ANION GAP SERPL CALC-SCNC: 9 MMOL/L (ref 8–16)
APAP SERPL-MCNC: 4 UG/ML (ref 10–20)
AST SERPL-CCNC: 34 U/L (ref 10–40)
BACTERIA #/AREA URNS AUTO: ABNORMAL /HPF
BARBITURATES UR QL SCN>200 NG/ML: NEGATIVE
BASOPHILS # BLD AUTO: 0.03 K/UL (ref 0–0.2)
BASOPHILS NFR BLD: 0.7 % (ref 0–1.9)
BENZODIAZ UR QL SCN>200 NG/ML: NEGATIVE
BILIRUB SERPL-MCNC: 0.3 MG/DL (ref 0.1–1)
BILIRUB UR QL STRIP: NEGATIVE
BUN SERPL-MCNC: 10 MG/DL (ref 8–23)
BZE UR QL SCN: NEGATIVE
CALCIUM SERPL-MCNC: 9.9 MG/DL (ref 8.7–10.5)
CANNABINOIDS UR QL SCN: NEGATIVE
CAOX CRY UR QL COMP ASSIST: ABNORMAL
CHLORIDE SERPL-SCNC: 103 MMOL/L (ref 95–110)
CLARITY UR REFRACT.AUTO: ABNORMAL
CO2 SERPL-SCNC: 29 MMOL/L (ref 23–29)
COLOR UR AUTO: ABNORMAL
CREAT SERPL-MCNC: 0.7 MG/DL (ref 0.5–1.4)
CREAT UR-MCNC: 118 MG/DL (ref 15–325)
DIFFERENTIAL METHOD: ABNORMAL
EOSINOPHIL # BLD AUTO: 0.1 K/UL (ref 0–0.5)
EOSINOPHIL NFR BLD: 3.4 % (ref 0–8)
ERYTHROCYTE [DISTWIDTH] IN BLOOD BY AUTOMATED COUNT: 13 % (ref 11.5–14.5)
EST. GFR  (AFRICAN AMERICAN): >60 ML/MIN/1.73 M^2
EST. GFR  (NON AFRICAN AMERICAN): >60 ML/MIN/1.73 M^2
ETHANOL SERPL-MCNC: <10 MG/DL
GLUCOSE SERPL-MCNC: 115 MG/DL (ref 70–110)
GLUCOSE UR QL STRIP: NEGATIVE
HCT VFR BLD AUTO: 46.3 % (ref 37–48.5)
HGB BLD-MCNC: 14.1 G/DL (ref 12–16)
HGB UR QL STRIP: NEGATIVE
IMM GRANULOCYTES # BLD AUTO: 0.01 K/UL (ref 0–0.04)
IMM GRANULOCYTES NFR BLD AUTO: 0.2 % (ref 0–0.5)
KETONES UR QL STRIP: NEGATIVE
LEUKOCYTE ESTERASE UR QL STRIP: ABNORMAL
LYMPHOCYTES # BLD AUTO: 1.7 K/UL (ref 1–4.8)
LYMPHOCYTES NFR BLD: 41.8 % (ref 18–48)
MCH RBC QN AUTO: 27.5 PG (ref 27–31)
MCHC RBC AUTO-ENTMCNC: 30.5 G/DL (ref 32–36)
MCV RBC AUTO: 90 FL (ref 82–98)
METHADONE UR QL SCN>300 NG/ML: NEGATIVE
MICROSCOPIC COMMENT: ABNORMAL
MONOCYTES # BLD AUTO: 0.4 K/UL (ref 0.3–1)
MONOCYTES NFR BLD: 8.7 % (ref 4–15)
NEUTROPHILS # BLD AUTO: 1.9 K/UL (ref 1.8–7.7)
NEUTROPHILS NFR BLD: 45.2 % (ref 38–73)
NITRITE UR QL STRIP: NEGATIVE
NRBC BLD-RTO: 0 /100 WBC
OPIATES UR QL SCN: NORMAL
PCP UR QL SCN>25 NG/ML: NEGATIVE
PH UR STRIP: 7 [PH] (ref 5–8)
PLATELET # BLD AUTO: 240 K/UL (ref 150–350)
PMV BLD AUTO: 10.7 FL (ref 9.2–12.9)
POTASSIUM SERPL-SCNC: 3.6 MMOL/L (ref 3.5–5.1)
PROT SERPL-MCNC: 8.1 G/DL (ref 6–8.4)
PROT UR QL STRIP: NEGATIVE
RBC # BLD AUTO: 5.12 M/UL (ref 4–5.4)
SALICYLATES SERPL-MCNC: <5 MG/DL (ref 15–30)
SODIUM SERPL-SCNC: 141 MMOL/L (ref 136–145)
SP GR UR STRIP: 1.02 (ref 1–1.03)
SQUAMOUS #/AREA URNS AUTO: 4 /HPF
TOXICOLOGY INFORMATION: NORMAL
TSH SERPL DL<=0.005 MIU/L-ACNC: 3.2 UIU/ML (ref 0.4–4)
URN SPEC COLLECT METH UR: ABNORMAL
WBC # BLD AUTO: 4.14 K/UL (ref 3.9–12.7)
WBC #/AREA URNS AUTO: 5 /HPF (ref 0–5)

## 2020-04-06 PROCEDURE — 63600175 PHARM REV CODE 636 W HCPCS: Performed by: PHYSICIAN ASSISTANT

## 2020-04-06 PROCEDURE — 99285 PR EMERGENCY DEPT VISIT,LEVEL V: ICD-10-PCS | Mod: ,,, | Performed by: PHYSICIAN ASSISTANT

## 2020-04-06 PROCEDURE — 63600175 PHARM REV CODE 636 W HCPCS: Performed by: EMERGENCY MEDICINE

## 2020-04-06 PROCEDURE — 80320 DRUG SCREEN QUANTALCOHOLS: CPT

## 2020-04-06 PROCEDURE — 99285 EMERGENCY DEPT VISIT HI MDM: CPT | Mod: 25

## 2020-04-06 PROCEDURE — 81001 URINALYSIS AUTO W/SCOPE: CPT | Mod: 59

## 2020-04-06 PROCEDURE — 84443 ASSAY THYROID STIM HORMONE: CPT

## 2020-04-06 PROCEDURE — 80053 COMPREHEN METABOLIC PANEL: CPT

## 2020-04-06 PROCEDURE — 99285 EMERGENCY DEPT VISIT HI MDM: CPT | Mod: ,,, | Performed by: PHYSICIAN ASSISTANT

## 2020-04-06 PROCEDURE — 85025 COMPLETE CBC W/AUTO DIFF WBC: CPT

## 2020-04-06 PROCEDURE — 80329 ANALGESICS NON-OPIOID 1 OR 2: CPT

## 2020-04-06 PROCEDURE — 80307 DRUG TEST PRSMV CHEM ANLYZR: CPT

## 2020-04-06 PROCEDURE — 96372 THER/PROPH/DIAG INJ SC/IM: CPT

## 2020-04-06 RX ORDER — HALOPERIDOL 5 MG/ML
5 INJECTION INTRAMUSCULAR
Status: COMPLETED | OUTPATIENT
Start: 2020-04-06 | End: 2020-04-06

## 2020-04-06 RX ORDER — PERMETHRIN 50 MG/G
CREAM TOPICAL
Qty: 60 G | Refills: 0 | Status: SHIPPED | OUTPATIENT
Start: 2020-04-06 | End: 2020-04-06 | Stop reason: CLARIF

## 2020-04-06 RX ADMIN — HALOPERIDOL LACTATE 5 MG: 5 INJECTION, SOLUTION INTRAMUSCULAR at 11:04

## 2020-04-06 NOTE — ED PROVIDER NOTES
"Encounter Date: 4/6/2020       History     Chief Complaint   Patient presents with    Multiple Complaints     ???states been coughing up bugs     The patient is a 69 year old female, who has a past medical history significant for HTN, Hyperlipidemia, Obesity, osteoarthritis, and chronic knee pain. She denies any history of psychiatric or mental illness. She was sent to the ER by her primary care physician for an emergent psychiatric evaluation and possible admission. The patient has been c/o bugs on her bedding, clothes, furniture, hair, and skin for the past 2 months. She has had numerous exterminators come by who are unable to identify any infestations. She has been to primary care and has been using Nix, Elimite, and Permethrin, without any alleviation. She cut her long hair short due to this. She wraps the rubber cushion of her walker with aluminum foil to "seal the bugs in the cushion". She is not sleeping due the perceived bugs. She has been reading online and has tried using Bleach on her things and is throwing away her belongings. She told her PCP earlier today that she thinks the bugs have gotten into her lungs and throat. She told her PCP that she does not know what else to do and now she is having suicidal thoughts.     Note from her PCP today:    "Pt was seen by me a few weeks ago and now is calling back about the same problem. This problems was going on for several weeks prior to her seeing me on 3/26/20.    She states after she used the permithrin cream I gave her she felt better for 1 day but symptoms returned. And also she is having trouble with the bugs getting into her throat at night .  She has been to the "do it yourself pest control" group in [facility] and they said there are no such  insects that get into rubber.    She states when she is lying down she begins to cough and she coughs up little bugs - some that are brown and some are silver and black.   She has been spraying with bleach and hot " "water and yogurt and that seemed to help a little but they keep coming back. She states the bugs fly.   She has had the pest control people from her apartment complex come to her  Apartment and they didn't find anything. She is at her wits end.   She states she hasnt been sleeping for days she hasnt been eating- she has lost 12 pounds. She is so upset she is feeling suicidal at this point.   I spoke with the ED at [facility] and gave them pts information and then called pt back and advised her to go there. I believe she needs a psych eval and may need admitted."        Review of patient's allergies indicates:   Allergen Reactions    Iodine and iodide containing products     Lipitor [atorvastatin]     Tramadol      Past Medical History:   Diagnosis Date    Allergy     Hyperlipidemia     Hypertension     Knee pain     Osteoporosis      Past Surgical History:   Procedure Laterality Date    REPLACEMENT OF LINER OF HIP JOINT       No family history on file.  Social History     Tobacco Use    Smoking status: Never Smoker    Smokeless tobacco: Never Used   Substance Use Topics    Alcohol use: Not Currently    Drug use: Not on file     Review of Systems   Constitutional: Positive for appetite change. Negative for chills and fever.   HENT: Negative for trouble swallowing.    Eyes: Negative for visual disturbance.   Respiratory: Negative for cough and shortness of breath.    Cardiovascular: Negative for chest pain.   Gastrointestinal: Negative for abdominal pain, diarrhea, nausea and vomiting.   Genitourinary: Negative for decreased urine volume, difficulty urinating and dysuria.   Musculoskeletal: Negative for myalgias.   Skin: Negative for color change and rash.   Allergic/Immunologic: Negative for immunocompromised state.   Neurological: Negative for dizziness, tremors, seizures, syncope, speech difficulty, light-headedness and headaches.   Psychiatric/Behavioral: Positive for hallucinations, sleep " disturbance and suicidal ideas. Negative for agitation, behavioral problems and self-injury.       Physical Exam     Initial Vitals [04/06/20 1645]   BP Pulse Resp Temp SpO2   (!) 155/98 82 18 98.5 °F (36.9 °C) 97 %      MAP       --         Physical Exam    Nursing note and vitals reviewed.  Constitutional: She appears well-developed and well-nourished. She is not diaphoretic. No distress.   HENT:   Head: Normocephalic.   Mouth/Throat: Oropharynx is clear and moist. No oropharyngeal exudate.   Eyes: Conjunctivae are normal.   Cardiovascular: Normal rate, regular rhythm and intact distal pulses.   Pulmonary/Chest: Breath sounds normal. No respiratory distress. She has no wheezes. She has no rhonchi. She has no rales.   Abdominal: Soft. She exhibits no distension. There is no tenderness.   Musculoskeletal: Normal range of motion.   Neurological: She is alert and oriented to person, place, and time. She has normal strength. No sensory deficit.   Skin: Skin is warm. No rash noted. No erythema.   Psychiatric:   Pt reports suicidal thoughts. Does not have a specific plan. Pt with possible delusions/hallucinations - seeing bugs everywhere. Denies any auditory hallucinations. Denies drug or alcohol use. Denies any previous psych history.          ED Course   Procedures  Labs Reviewed   CBC W/ AUTO DIFFERENTIAL - Abnormal; Notable for the following components:       Result Value    Mean Corpuscular Hemoglobin Conc 30.5 (*)     All other components within normal limits   COMPREHENSIVE METABOLIC PANEL - Abnormal; Notable for the following components:    Glucose 115 (*)     All other components within normal limits   URINALYSIS, REFLEX TO URINE CULTURE - Abnormal; Notable for the following components:    Appearance, UA Cloudy (*)     Leukocytes, UA Trace (*)     All other components within normal limits    Narrative:     Preferred Collection Type->Urine, Clean Catch   ACETAMINOPHEN LEVEL - Abnormal; Notable for the following  components:    Acetaminophen (Tylenol), Serum 4.0 (*)     All other components within normal limits   SALICYLATE LEVEL - Abnormal; Notable for the following components:    Salicylate Lvl <5.0 (*)     All other components within normal limits   URINALYSIS MICROSCOPIC - Abnormal; Notable for the following components:    Bacteria Moderate (*)     All other components within normal limits    Narrative:     Preferred Collection Type->Urine, Clean Catch   TSH   DRUG SCREEN PANEL, URINE EMERGENCY    Narrative:     Preferred Collection Type->Urine, Clean Catch   ALCOHOL,MEDICAL (ETHANOL)     Results for orders placed or performed during the hospital encounter of 04/06/20   CBC auto differential   Result Value Ref Range    WBC 4.14 3.90 - 12.70 K/uL    RBC 5.12 4.00 - 5.40 M/uL    Hemoglobin 14.1 12.0 - 16.0 g/dL    Hematocrit 46.3 37.0 - 48.5 %    Mean Corpuscular Volume 90 82 - 98 fL    Mean Corpuscular Hemoglobin 27.5 27.0 - 31.0 pg    Mean Corpuscular Hemoglobin Conc 30.5 (L) 32.0 - 36.0 g/dL    RDW 13.0 11.5 - 14.5 %    Platelets 240 150 - 350 K/uL    MPV 10.7 9.2 - 12.9 fL    Immature Granulocytes 0.2 0.0 - 0.5 %    Gran # (ANC) 1.9 1.8 - 7.7 K/uL    Immature Grans (Abs) 0.01 0.00 - 0.04 K/uL    Lymph # 1.7 1.0 - 4.8 K/uL    Mono # 0.4 0.3 - 1.0 K/uL    Eos # 0.1 0.0 - 0.5 K/uL    Baso # 0.03 0.00 - 0.20 K/uL    nRBC 0 0 /100 WBC    Gran% 45.2 38.0 - 73.0 %    Lymph% 41.8 18.0 - 48.0 %    Mono% 8.7 4.0 - 15.0 %    Eosinophil% 3.4 0.0 - 8.0 %    Basophil% 0.7 0.0 - 1.9 %    Differential Method Automated    Comprehensive metabolic panel   Result Value Ref Range    Sodium 141 136 - 145 mmol/L    Potassium 3.6 3.5 - 5.1 mmol/L    Chloride 103 95 - 110 mmol/L    CO2 29 23 - 29 mmol/L    Glucose 115 (H) 70 - 110 mg/dL    BUN, Bld 10 8 - 23 mg/dL    Creatinine 0.7 0.5 - 1.4 mg/dL    Calcium 9.9 8.7 - 10.5 mg/dL    Total Protein 8.1 6.0 - 8.4 g/dL    Albumin 4.0 3.5 - 5.2 g/dL    Total Bilirubin 0.3 0.1 - 1.0 mg/dL    Alkaline  Phosphatase 74 55 - 135 U/L    AST 34 10 - 40 U/L    ALT 25 10 - 44 U/L    Anion Gap 9 8 - 16 mmol/L    eGFR if African American >60.0 >60 mL/min/1.73 m^2    eGFR if non African American >60.0 >60 mL/min/1.73 m^2   TSH   Result Value Ref Range    TSH 3.201 0.400 - 4.000 uIU/mL   Urinalysis, Reflex to Urine Culture Urine, Clean Catch   Result Value Ref Range    Specimen UA Urine, Clean Catch     Color, UA Breann Yellow, Straw, Breann    Appearance, UA Cloudy (A) Clear    pH, UA 7.0 5.0 - 8.0    Specific Gravity, UA 1.020 1.005 - 1.030    Protein, UA Negative Negative    Glucose, UA Negative Negative    Ketones, UA Negative Negative    Bilirubin (UA) Negative Negative    Occult Blood UA Negative Negative    Nitrite, UA Negative Negative    Leukocytes, UA Trace (A) Negative   Drug screen panel, emergency   Result Value Ref Range    Benzodiazepines Negative     Methadone metabolites Negative     Cocaine (Metab.) Negative     Opiate Scrn, Ur Presumptive Positive     Barbiturate Screen, Ur Negative     Amphetamine Screen, Ur Negative     THC Negative     Phencyclidine Negative     Creatinine, Random Ur 118.0 15.0 - 325.0 mg/dL    Toxicology Information SEE COMMENT    Ethanol   Result Value Ref Range    Alcohol, Medical, Serum <10 <10 mg/dL   Acetaminophen level   Result Value Ref Range    Acetaminophen (Tylenol), Serum 4.0 (L) 10.0 - 20.0 ug/mL   Salicylate level   Result Value Ref Range    Salicylate Lvl <5.0 (L) 15.0 - 30.0 mg/dL   Urinalysis Microscopic   Result Value Ref Range    WBC, UA 5 0 - 5 /hpf    Bacteria Moderate (A) None-Occ /hpf    Squam Epithel, UA 4 /hpf    Ca Oxalate Florinda, UA Rare None-Moderate    Amorphous, UA Few None-Moderate    Microscopic Comment SEE COMMENT             Imaging Results          CT Head Without Contrast (Final result)  Result time 04/06/20 18:25:54    Final result by Marc Hardy MD (04/06/20 18:25:54)                 Impression:      1. No acute intracranial abnormalities noting  sequela of chronic microvascular ischemic change and senescent change.      Electronically signed by: Marc Hardy MD  Date:    04/06/2020  Time:    18:25             Narrative:    EXAMINATION:  CT HEAD WITHOUT CONTRAST    CLINICAL HISTORY:  Confusion/delirium, altered LOC, unexplained;    TECHNIQUE:  Low dose axial images were obtained through the head.  Coronal and sagittal reformations were also performed. Contrast was not administered.    COMPARISON:  None.    FINDINGS:  There is generalized cerebral volume loss.  There is hypoattenuation in a periventricular fashion, likely sequela of chronic microvascular ischemic change.  There is no evidence of acute major vascular territory infarct, hemorrhage, or mass.  There is no hydrocephalus.  There are no abnormal extra-axial fluid collections.  The paranasal sinuses and mastoid air cells are clear, and there is no evidence of calvarial fracture.  The visualized soft tissues are unremarkable.                                 Medical Decision Making:   History:   Old Medical Records: I decided to obtain old medical records.  Initial Assessment:   Pt sent by PCP to ER for an emergent psychiatric evaluation due to possible psychosis and suicidal ideations   Differential Diagnosis:   Hallucinations, delusions, psychosis, medical problem, neurological problem, infection, etc   Clinical Tests:   Lab Tests: Ordered and Reviewed  Radiological Study: Ordered and Reviewed  ED Management:  I discussed the case in detail with the ER attending physician   I discussed the case in detail with psychiatry, who will evaluate the patient in the ED   Psychiatry recommends PEC - inpatient psychiatric evaluation and treatment   2nd discussion with the ER attending physician, who requested that I fill out and sign a PEC, then medically clear pt for transfer to inpatient psychiatric facility.   Other:   I have discussed this case with another health care provider.              Attending  Attestation:     Physician Attestation Statement for NP/PA:   I discussed this assessment and plan of this patient with the NP/PA, but I did not personally examine the patient. The face to face encounter was performed by the NP/PA.                                Clinical Impression:       ICD-10-CM ICD-9-CM   1. Suicidal ideations R45.851 V62.84   2. Delusional disorder currently symptomatic F22 297.1         Disposition:   Condition: Stable     ED Disposition Condition    Transfer to Psych Facility         ED Prescriptions     Medication Sig Dispense Start Date End Date Auth. Provider    permethrin (ELIMITE) 5 % cream  (Status: Discontinued) Use as directed. Repeat in 2 weeks. 60 g 4/6/2020 4/6/2020 Capo Mcintosh PA-C        Follow-up Information    None                                           Capo Mcintosh PA-C  04/06/20 2123       Travis Austin MD  04/07/20 [id number]

## 2020-04-06 NOTE — TELEPHONE ENCOUNTER
"Pt was seen by me a few weeks ago and now is calling back about the same problem. This problems was going on for several weeks prior to her seeing me on 3/26/20.    She states after she used the permithrin cream I gave her she felt better for 1 day but symptoms returned. And also she is having trouble with the bugs getting into her throat at night .  She has been to the "do it yourself pest control" group in [facility] and they said there are no such  insects that get into rubber.    She states when she is lying down she begins to cough and she coughs up little bugs - some that are brown and some are silver and black.   She has been spraying with bleach and hot water and yogurt and that seemed to help a little but they keep coming back. . She states the bugs fly.   She has had the pest control people from her apartment complex come to her  Apartment and they didn't find anything. She is at her wits end.   She states she hasnt been sleeping for days she hasnt been eating- she has lost 12 pounds. She is so upset she is feeling suicidal at this point.   I spoke with the ED at [facility] and gave them pts information and then called pt back and advised her to go there.  I believe she needs a psych eval and may need admitted.   "

## 2020-04-06 NOTE — ED NOTES
Pt placed in paper scrubs and all hazardous items removed from the room. Pt calm and cooperative and sitter maintaining 1:1 supervision.  TV on and lights off. Pt resting comfortably and independently repositioned in stretcher with bed locked in lowest position for safety. NAD noted at this time. Respirations even and unlabored and visible chest rise noted.  Patient offered bathroom assistance and ambulated to the restroom with walker to provide sample. No needs at this time. Will continue to monitor.

## 2020-04-06 NOTE — ED TRIAGE NOTES
"Daksha Collins, an 69 y.o. female presents to the ED stating that she's coughing up bugs and that she has bugs all over her and all over her house.  Patient was given elimite lotion as a prophylactic in case she had scabies but when she called to get another dose, she states that the doctor told her she was crazy.  Patient has little cups with "bugs" in them that do not in fact, contain bugs.       Review of patient's allergies indicates:   Allergen Reactions    Iodine and iodide containing products     Lipitor [atorvastatin]     Tramadol      Chief Complaint   Patient presents with    Multiple Complaints     ???states been coughing up bugs     Past Medical History:   Diagnosis Date    Allergy     Hyperlipidemia     Hypertension     Knee pain     Osteoporosis        "

## 2020-04-07 VITALS
HEART RATE: 68 BPM | RESPIRATION RATE: 14 BRPM | WEIGHT: 250 LBS | HEIGHT: 68 IN | BODY MASS INDEX: 37.89 KG/M2 | OXYGEN SATURATION: 97 % | DIASTOLIC BLOOD PRESSURE: 59 MMHG | SYSTOLIC BLOOD PRESSURE: 124 MMHG | TEMPERATURE: 98 F

## 2020-04-07 NOTE — SUBJECTIVE & OBJECTIVE
Patient History           Medical as of 4/6/2020     Past Medical History     Diagnosis Date Comments Source    Allergy -- -- Provider    Hyperlipidemia -- -- Provider    Hypertension -- -- Provider    Knee pain -- -- Provider    Osteoporosis -- -- Provider                  Surgical as of 4/6/2020     Past Surgical History     Procedure Laterality Date Comments Source    REPLACEMENT OF LINER OF HIP JOINT -- -- -- Provider                  Family as of 4/6/2020    None           Tobacco Use as of 4/6/2020     Smoking Status Smoking Start Date Smoking Quit Date Packs/Day Years Used    Never Smoker -- -- -- --    Types Comments Smokeless Tobacco Status Smokeless Tobacco Quit Date Source    -- -- Never Used -- Provider            Alcohol Use as of 4/6/2020     Alcohol Use Drinks/Week Alcohol/Week Comments Source    Not Currently -- -- -- Provider    Frequency Standard Drinks Binge Drinking        -- -- --              Drug Use as of 4/6/2020     Drug Use Types Frequency Comments Source    -- -- -- -- Provider            Sexual Activity as of 4/6/2020     Sexually Active Birth Control Partners Comments Source    -- -- -- -- Provider            Activities of Daily Living as of 4/6/2020    None           Social Documentation as of 4/6/2020    None           Occupational as of 4/6/2020    None           Socioeconomic as of 4/6/2020     Marital Status Spouse Name Number of Children Years Education Education Level Preferred Language Ethnicity Race Source    Single -- -- -- -- English /Black Black or  Provider    Financial Resource Strain Food Insecurity: Worry Food Insecurity: Inability Transportation Needs: Medical Transportation Needs: Non-medical    -- -- -- -- --            Pertinent History     Question Response Comments    Lives with -- --    Place in Birth Order -- --    Lives in -- --    Number of Siblings -- --    Raised by -- --    Legal Involvement -- --    Childhood Trauma --  --    Criminal History of -- --    Financial Status -- --    Highest Level of Education -- --    Does patient have access to a firearm? -- --     Service -- --    Primary Leisure Activity -- --    Spirituality -- --        Past Medical History:   Diagnosis Date    Allergy     Hyperlipidemia     Hypertension     Knee pain     Osteoporosis      Past Surgical History:   Procedure Laterality Date    REPLACEMENT OF LINER OF HIP JOINT       Family History     None        Tobacco Use    Smoking status: Never Smoker    Smokeless tobacco: Never Used   Substance and Sexual Activity    Alcohol use: Not Currently    Drug use: Not on file    Sexual activity: Not on file     Review of patient's allergies indicates:   Allergen Reactions    Iodine and iodide containing products     Lipitor [atorvastatin]     Tramadol        No current facility-administered medications on file prior to encounter.      Current Outpatient Medications on File Prior to Encounter   Medication Sig    alendronate (FOSAMAX) 70 MG tablet Take 70 mg by mouth every 7 days.    aspirin (ECOTRIN) 81 MG EC tablet Take 81 mg by mouth once daily.    azelastine (OPTIVAR) 0.05 % ophthalmic solution 1 drop 2 (two) times daily.    crotamiton (CROTAN) 10 % cream Apply topically once daily.    diclofenac sodium (VOLTAREN) 1 % Gel Apply 2 g topically once daily.    esomeprazole (NEXIUM) 40 MG capsule Take 40 mg by mouth before breakfast.    fluticasone (VERAMYST) 27.5 mcg/actuation nasal spray 2 sprays by Nasal route once daily.    FLUTICASONE-UMECLIDIN-VILANTER INHL Inhale into the lungs.    fluticasone/vilanterol (BREO ELLIPTA INHL) Inhale into the lungs.    gabapentin (NEURONTIN) 300 MG capsule Take 300 mg by mouth 3 (three) times daily.    hydroCHLOROthiazide (HYDRODIURIL) 12.5 MG Tab Take 12.5 mg by mouth once daily.    hydrocodone-acetaminophen 5-325mg (NORCO) 5-325 mg per tablet Take 1 tablet by mouth every 6 (six) hours as needed  "for Pain.    mirabegron (MYRBETRIQ ORAL) Take by mouth.    mv-mn/iron/folic acid/herb 190 (VITAMIN D3 COMPLETE ORAL) Take by mouth.    rosuvastatin 10 mg CpSP Take by mouth.    sodium chloride (SALINE NASAL) 0.65 % nasal spray 1 spray by Nasal route as needed for Congestion.    zonisamide (ZONEGRAN) 100 MG Cap Take by mouth.     Psychotherapeutics (From admission, onward)    None        Review of Systems  Strengths and Liabilities: Strength: Patient is expressive/articulate., Liability: Patient has no suport network., Liability: Patient has poor judgment    Objective:     Vital Signs (Most Recent):  Temp: 98.5 °F (36.9 °C) (04/06/20 1645)  Pulse: 82 (04/06/20 1645)  Resp: 18 (04/06/20 1645)  BP: (!) 155/98 (04/06/20 1645)  SpO2: 97 % (04/06/20 1645) Vital Signs (24h Range):  Temp:  [98.5 °F (36.9 °C)] 98.5 °F (36.9 °C)  Pulse:  [82] 82  Resp:  [18] 18  SpO2:  [97 %] 97 %  BP: (155)/(98) 155/98     Height: 5' 7.5" (171.5 cm)  Weight: 113.4 kg (250 lb)  Body mass index is 38.58 kg/m².    No intake or output data in the 24 hours ending 04/06/20 1957    Physical Exam   Psychiatric:   Mental Status Exam:  Appearance: fair hygiene and grooming, dressed in hospital scrubs, NAD, appears stated age   Behavior/Cooperation: calm, cooperative, no tremors or psychomotor agiation  Language: fluent english  Speech: normal tone, normal rate, normal pitch, normal volume  Mood: "no good"  Affect: full, reactive, appropriate  Thought Process: linear, logical, goal-directed  Thought Content: + Delusions of bugs crawling on her and coughing them up. denies SI/HI/paranoia; no objective evidence of paranoia  Perception: Tactile and VH of clear and brown bugs. denies AH; no objective evidence of AH   Orientation: A/O x 4  Memory: intact to conversation, Recent and remote intact  Attention Span/Concentration: intact to conversation  Fund of Knowledge: appropriate for education level, able to state last 3 presidents  Insight: " poor  Judgment: good        Significant Labs:   Last 24 Hours:   Recent Lab Results       04/06/20  1754   04/06/20  1752   04/06/20  1751        Benzodiazepines Negative         Methadone metabolites Negative         Phencyclidine Negative         Acetaminophen (Tylenol), Serum     4.0  Comment:  Toxic Levels:  Adults (4 hr post-ingestion).........>150 ug/mL  Adults (12 hr post-ingestion)........>40 ug/mL  Peds (2 hr post-ingestion, liquid)...>225 ug/mL       Albumin     4.0     Alcohol, Medical, Serum     <10     Alkaline Phosphatase     74     ALT     25     Amorphous, UA   Few       Amphetamine Screen, Ur Negative         Anion Gap     9     Appearance, UA   Cloudy       AST     34     Bacteria, UA   Moderate       Barbiturate Screen, Ur Negative         Baso #     0.03     Basophil%     0.7     Bilirubin (UA)   Negative       BILIRUBIN TOTAL     0.3  Comment:  For infants and newborns, interpretation of results should be based  on gestational age, weight and in agreement with clinical  observations.  Premature Infant recommended reference ranges:  Up to 24 hours.............<8.0 mg/dL  Up to 48 hours............<12.0 mg/dL  3-5 days..................<15.0 mg/dL  6-29 days.................<15.0 mg/dL       BUN, Bld     10     Ca Oxalate Florinda, UA   Rare       Calcium     9.9     Chloride     103     CO2     29     Cocaine (Metab.) Negative         Color, UA   Breann       Creatinine     0.7     Creatinine, Random Ur 118.0  Comment:  The random urine reference ranges provided were established   for 24 hour urine collections.  No reference ranges exist for  random urine specimens.  Correlate clinically.           Differential Method     Automated     eGFR if      >60.0     eGFR if non      >60.0  Comment:  Calculation used to obtain the estimated glomerular filtration  rate (eGFR) is the CKD-EPI equation.        Eos #     0.1     Eosinophil%     3.4     Glucose     115     Glucose, UA    Negative       Gran # (ANC)     1.9     Gran%     45.2     Hematocrit     46.3     Hemoglobin     14.1     Immature Grans (Abs)     0.01  Comment:  Mild elevation in immature granulocytes is non specific and   can be seen in a variety of conditions including stress response,   acute inflammation, trauma and pregnancy. Correlation with other   laboratory and clinical findings is essential.       Immature Granulocytes     0.2     Ketones, UA   Negative       Leukocytes, UA   Trace       Lymph #     1.7     Lymph%     41.8     MCH     27.5     MCHC     30.5     MCV     90     Microscopic Comment   SEE COMMENT  Comment:  Other formed elements not mentioned in the report are not   present in the microscopic examination.          Mono #     0.4     Mono%     8.7     MPV     10.7     NITRITE UA   Negative       nRBC     0     Occult Blood UA   Negative       Opiate Scrn, Ur Presumptive Positive         pH, UA   7.0       Platelets     240     Potassium     3.6     PROTEIN TOTAL     8.1     Protein, UA   Negative  Comment:  Recommend a 24 hour urine protein or a urine   protein/creatinine ratio if globulin induced proteinuria is  clinically suspected.         RBC     5.12     RDW     13.0     Salicylate Lvl     <5.0  Comment:  Toxic:  30.0 - 70.0 mg/dl  Lethal: >70.0 mg/dl       Sodium     141     Specific Gravity, UA   1.020       Specimen UA   Urine, Clean Catch       Squam Epithel, UA   4       Marijuana (THC) Metabolite Negative         Toxicology Information SEE COMMENT  Comment:  This screen includes the following classes of drugs at the   listed cut-off:  Benzodiazepines                  200 ng/ml  Methadone                        300 ng/ml  Cocaine metabolite               300 ng/ml  Opiates                          300 ng/ml  Barbiturates                     200 ng/ml  Amphetamines                    1000 ng/ml  Marijuana metabs (THC)            50 ng/ml  Phencyclidine (PCP)               25 ng/ml  High  concentrations of Diphenhydramine may cross-react with  Phencyclidine PCP screening immunoassay giving a false   positive result.  High concentrations of Methylenedioxymethamphetamine (MDMA aka  Ectasy) and other structurally similar compounds may cross-   react with the Amphetamine/Methamphetamine screening   immunoassay giving a false positive result.  A metabolite of the anti-HIV drug Sustiva () may cause  false positive results in the Marijuana metabolite (THC)   screening assay.  Note: This exception list includes only more common   interferants in toxicology screen testing.  Because of many   cross-reactantspositive results on toxicology drug screens   should be confirmed whenever results do not correlate with   clinical presentation.  This report is intended for use in clinical monitoring and  management of patients. It is not intended for use in   employment related drug testing.  Because of any cross-reactants, positive results on toxicology  drug screens should be confirmed whenever results do not  correlate with clinical presentation.  Presumptive positive results are unconfirmed and may be used   only for medical purposes.  Assay Intended Use: This assay provides only a preliminary analytical  test result. A more specific alternate chemical method must be used  to obtain a confirmed analytical result. Gas chromatography/mass  spectrometry (GS/MS)is the preferred confirmatory method. Clinical  consideration and professional judgement should be applied to any   drug of abuse test result, particularly when preliminary results  are used.           WBC, UA   5       WBC     4.14           Significant Imaging: I have reviewed all pertinent imaging results/findings within the past 24 hours.

## 2020-04-07 NOTE — ED NOTES
Pt resting quietly on stretcher; is calm and cooperative.  Sitter remains at bedside.  Pt denies needs or complaints at this time.  Bed locked in lowest position; side rails up and locked x 2.  Pt instructed to alert sitter or nurse for assistance and before attempting to get out of bed.  Will continue to monitor pt.

## 2020-04-07 NOTE — ASSESSMENT & PLAN NOTE
ASSESSMENT     Daksha Collins is a 69 y.o. female with a no past psychiatric history who presented to the [facility] due to VH of bugs that she was coughing up. Patient has no recent medication changes, or new medications that could be causing her VH. She denies illicit drug use and EtOH use. She has delusions of bugs being in her home, and in her body. She endorses tactile and Vh of these bugs. Patient continues to endorse that she will do anything to get rid of the bugs. Patient would benefit from inpatient psych hospitalization due to grave disability.     IMPRESSION  Delusional disorder  R/o dementia vs neurocognitive disorder  R/o schizophreniform    RECOMMENDATION(S)      Medication(s):  Can schedule Zyprexa 2.5 mg QHS PRN for sleep and agitation.     Labs:  Can continue to obtain other labs to r/o other causes of VH: Vitamin B12, TSH, and RPR    Legal Status/Precaution(s): Recommend/continue PEC/CEC as patient is in imminent danger of hurting self or others and is gravely disabled due to a psychiatric illness.        Case discussed with attending psychiatrist: Dr. Tripathi.
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
## 2021-05-15 ENCOUNTER — OFFICE VISIT (OUTPATIENT)
Dept: URGENT CARE | Facility: CLINIC | Age: 71
End: 2021-05-15
Payer: MEDICARE

## 2021-05-15 VITALS
TEMPERATURE: 98 F | OXYGEN SATURATION: 97 % | WEIGHT: 250 LBS | SYSTOLIC BLOOD PRESSURE: 127 MMHG | RESPIRATION RATE: 16 BRPM | HEIGHT: 67 IN | HEART RATE: 73 BPM | BODY MASS INDEX: 39.24 KG/M2 | DIASTOLIC BLOOD PRESSURE: 75 MMHG

## 2021-05-15 DIAGNOSIS — L98.9 SKIN LESION: Primary | ICD-10-CM

## 2021-05-15 PROCEDURE — 3008F BODY MASS INDEX DOCD: CPT | Mod: CPTII,S$GLB,, | Performed by: FAMILY MEDICINE

## 2021-05-15 PROCEDURE — 3008F PR BODY MASS INDEX (BMI) DOCUMENTED: ICD-10-PCS | Mod: CPTII,S$GLB,, | Performed by: FAMILY MEDICINE

## 2021-05-15 PROCEDURE — 99214 OFFICE O/P EST MOD 30 MIN: CPT | Mod: S$GLB,,, | Performed by: FAMILY MEDICINE

## 2021-05-15 PROCEDURE — 99214 PR OFFICE/OUTPT VISIT, EST, LEVL IV, 30-39 MIN: ICD-10-PCS | Mod: S$GLB,,, | Performed by: FAMILY MEDICINE

## 2021-05-15 RX ORDER — PANTOPRAZOLE SODIUM 20 MG/1
20 TABLET, DELAYED RELEASE ORAL DAILY
COMMUNITY

## 2021-05-15 RX ORDER — NABUMETONE 500 MG/1
500 TABLET, FILM COATED ORAL 2 TIMES DAILY
COMMUNITY

## 2021-05-15 RX ORDER — MUPIROCIN 20 MG/G
OINTMENT TOPICAL DAILY
Qty: 30 G | Refills: 0 | Status: SHIPPED | OUTPATIENT
Start: 2021-05-15 | End: 2021-05-22

## 2021-05-19 ENCOUNTER — OFFICE VISIT (OUTPATIENT)
Dept: DERMATOLOGY | Facility: CLINIC | Age: 71
End: 2021-05-19
Payer: MEDICARE

## 2021-05-19 DIAGNOSIS — L01.00 IMPETIGO: ICD-10-CM

## 2021-05-19 DIAGNOSIS — L82.1 SEBORRHEIC KERATOSES: Primary | ICD-10-CM

## 2021-05-19 PROCEDURE — 1101F PT FALLS ASSESS-DOCD LE1/YR: CPT | Mod: CPTII,S$GLB,, | Performed by: DERMATOLOGY

## 2021-05-19 PROCEDURE — 1126F AMNT PAIN NOTED NONE PRSNT: CPT | Mod: S$GLB,,, | Performed by: DERMATOLOGY

## 2021-05-19 PROCEDURE — 99203 PR OFFICE/OUTPT VISIT, NEW, LEVL III, 30-44 MIN: ICD-10-PCS | Mod: S$GLB,,, | Performed by: DERMATOLOGY

## 2021-05-19 PROCEDURE — 1101F PR PT FALLS ASSESS DOC 0-1 FALLS W/OUT INJ PAST YR: ICD-10-PCS | Mod: CPTII,S$GLB,, | Performed by: DERMATOLOGY

## 2021-05-19 PROCEDURE — 1159F PR MEDICATION LIST DOCUMENTED IN MEDICAL RECORD: ICD-10-PCS | Mod: S$GLB,,, | Performed by: DERMATOLOGY

## 2021-05-19 PROCEDURE — 1126F PR PAIN SEVERITY QUANTIFIED, NO PAIN PRESENT: ICD-10-PCS | Mod: S$GLB,,, | Performed by: DERMATOLOGY

## 2021-05-19 PROCEDURE — 99999 PR PBB SHADOW E&M-EST. PATIENT-LVL III: CPT | Mod: PBBFAC,,, | Performed by: DERMATOLOGY

## 2021-05-19 PROCEDURE — 99203 OFFICE O/P NEW LOW 30 MIN: CPT | Mod: S$GLB,,, | Performed by: DERMATOLOGY

## 2021-05-19 PROCEDURE — 99999 PR PBB SHADOW E&M-EST. PATIENT-LVL III: ICD-10-PCS | Mod: PBBFAC,,, | Performed by: DERMATOLOGY

## 2021-05-19 PROCEDURE — 3288F FALL RISK ASSESSMENT DOCD: CPT | Mod: CPTII,S$GLB,, | Performed by: DERMATOLOGY

## 2021-05-19 PROCEDURE — 3288F PR FALLS RISK ASSESSMENT DOCUMENTED: ICD-10-PCS | Mod: CPTII,S$GLB,, | Performed by: DERMATOLOGY

## 2021-05-19 PROCEDURE — 1159F MED LIST DOCD IN RCRD: CPT | Mod: S$GLB,,, | Performed by: DERMATOLOGY

## 2021-05-19 RX ORDER — CEPHALEXIN 500 MG/1
500 CAPSULE ORAL 2 TIMES DAILY
Qty: 14 CAPSULE | Refills: 0 | Status: SHIPPED | OUTPATIENT
Start: 2021-05-19 | End: 2021-05-26
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
## 2022-05-25 ENCOUNTER — OFFICE VISIT (OUTPATIENT)
Dept: URGENT CARE | Facility: CLINIC | Age: 72
End: 2022-05-25
Payer: MEDICARE

## 2022-05-25 VITALS
RESPIRATION RATE: 18 BRPM | HEART RATE: 96 BPM | WEIGHT: 250 LBS | BODY MASS INDEX: 39.24 KG/M2 | DIASTOLIC BLOOD PRESSURE: 97 MMHG | SYSTOLIC BLOOD PRESSURE: 153 MMHG | HEIGHT: 67 IN | TEMPERATURE: 96 F | OXYGEN SATURATION: 97 %

## 2022-05-25 DIAGNOSIS — R55 SYNCOPE, UNSPECIFIED SYNCOPE TYPE: Primary | ICD-10-CM

## 2022-05-25 LAB — GLUCOSE SERPL-MCNC: 116 MG/DL (ref 70–110)

## 2022-05-25 PROCEDURE — 93005 ELECTROCARDIOGRAM TRACING: CPT | Mod: S$GLB,,, | Performed by: NURSE PRACTITIONER

## 2022-05-25 PROCEDURE — 93010 ELECTROCARDIOGRAM REPORT: CPT | Mod: S$GLB,,, | Performed by: INTERNAL MEDICINE

## 2022-05-25 PROCEDURE — 3077F PR MOST RECENT SYSTOLIC BLOOD PRESSURE >= 140 MM HG: ICD-10-PCS | Mod: CPTII,S$GLB,, | Performed by: NURSE PRACTITIONER

## 2022-05-25 PROCEDURE — 3008F BODY MASS INDEX DOCD: CPT | Mod: CPTII,S$GLB,, | Performed by: NURSE PRACTITIONER

## 2022-05-25 PROCEDURE — 99215 OFFICE O/P EST HI 40 MIN: CPT | Mod: S$GLB,,, | Performed by: NURSE PRACTITIONER

## 2022-05-25 PROCEDURE — 1126F PR PAIN SEVERITY QUANTIFIED, NO PAIN PRESENT: ICD-10-PCS | Mod: CPTII,S$GLB,, | Performed by: NURSE PRACTITIONER

## 2022-05-25 PROCEDURE — 82962 POCT GLUCOSE, HAND-HELD DEVICE: ICD-10-PCS | Mod: S$GLB,,, | Performed by: NURSE PRACTITIONER

## 2022-05-25 PROCEDURE — 82962 GLUCOSE BLOOD TEST: CPT | Mod: S$GLB,,, | Performed by: NURSE PRACTITIONER

## 2022-05-25 PROCEDURE — 1160F PR REVIEW ALL MEDS BY PRESCRIBER/CLIN PHARMACIST DOCUMENTED: ICD-10-PCS | Mod: CPTII,S$GLB,, | Performed by: NURSE PRACTITIONER

## 2022-05-25 PROCEDURE — 3080F DIAST BP >= 90 MM HG: CPT | Mod: CPTII,S$GLB,, | Performed by: NURSE PRACTITIONER

## 2022-05-25 PROCEDURE — 99215 PR OFFICE/OUTPT VISIT, EST, LEVL V, 40-54 MIN: ICD-10-PCS | Mod: S$GLB,,, | Performed by: NURSE PRACTITIONER

## 2022-05-25 PROCEDURE — 93010 EKG 12-LEAD: ICD-10-PCS | Mod: S$GLB,,, | Performed by: INTERNAL MEDICINE

## 2022-05-25 PROCEDURE — 3008F PR BODY MASS INDEX (BMI) DOCUMENTED: ICD-10-PCS | Mod: CPTII,S$GLB,, | Performed by: NURSE PRACTITIONER

## 2022-05-25 PROCEDURE — 1160F RVW MEDS BY RX/DR IN RCRD: CPT | Mod: CPTII,S$GLB,, | Performed by: NURSE PRACTITIONER

## 2022-05-25 PROCEDURE — 1159F MED LIST DOCD IN RCRD: CPT | Mod: CPTII,S$GLB,, | Performed by: NURSE PRACTITIONER

## 2022-05-25 PROCEDURE — 3080F PR MOST RECENT DIASTOLIC BLOOD PRESSURE >= 90 MM HG: ICD-10-PCS | Mod: CPTII,S$GLB,, | Performed by: NURSE PRACTITIONER

## 2022-05-25 PROCEDURE — 1126F AMNT PAIN NOTED NONE PRSNT: CPT | Mod: CPTII,S$GLB,, | Performed by: NURSE PRACTITIONER

## 2022-05-25 PROCEDURE — 93005 EKG 12-LEAD: ICD-10-PCS | Mod: S$GLB,,, | Performed by: NURSE PRACTITIONER

## 2022-05-25 PROCEDURE — 1159F PR MEDICATION LIST DOCUMENTED IN MEDICAL RECORD: ICD-10-PCS | Mod: CPTII,S$GLB,, | Performed by: NURSE PRACTITIONER

## 2022-05-25 PROCEDURE — 3077F SYST BP >= 140 MM HG: CPT | Mod: CPTII,S$GLB,, | Performed by: NURSE PRACTITIONER

## 2022-05-25 RX ORDER — ROSUVASTATIN CALCIUM 40 MG/1
40 TABLET, COATED ORAL DAILY
COMMUNITY
Start: 2022-03-30

## 2022-05-25 RX ORDER — LOSARTAN POTASSIUM AND HYDROCHLOROTHIAZIDE 12.5; 1 MG/1; MG/1
1 TABLET ORAL DAILY
COMMUNITY
Start: 2022-03-23

## 2022-05-25 RX ORDER — MIRABEGRON 50 MG/1
1 TABLET, FILM COATED, EXTENDED RELEASE ORAL DAILY
COMMUNITY
Start: 2022-04-27

## 2022-05-25 NOTE — PROGRESS NOTES
"Subjective:       Patient ID: Daksha Collins is a 71 y.o. female.    Vitals:  height is 5' 7" (1.702 m) and weight is 113.4 kg (250 lb). Her temperature is 96.3 °F (35.7 °C). Her blood pressure is 153/97 (abnormal) and her pulse is 96. Her respiration is 18 and oxygen saturation is 97%.     Chief Complaint: Dizziness    Pt reports medication changes 2 months ago- denies to provider    70 yo female with c/o dizziness. pmh delusional ,knee pain/osteorthritis, carpal tunnel, htn obesity Body mass index is 39.16 kg/m².   . Patient states she has had two epidsodes of syncope in the past few days. She denies recent changes in medication. She states she heard in head right before she passed out. She states she was in bed the first time and the shower the next time. She denies hitting head. She states she has hit her head over the past several months once on head once on car. No ringing in ears. She did feel mildly nauseated. She took her blood pressure this morning. She ate breakfast. She denies diabetes although chart record has pre diabetes as outside diagnosis. She is not feel dizzy now. She states she had a headache last night. She states looking at computer at the time. She drank water.   She states she was seeing her pcp this morning but did not wake up in time and they would not see her due to dizziness.   She denies uri symptoms    Dizziness:   Chronicity:  New  Onset:  In the past 7 days (x2 days)  Progression since onset:  Unchanged  Frequency:  Constantly  Pain Scale:  0/10  Duration:  1 hour  Dizziness characteristics:  Spinning inside head only, off-balance and lightheaded/impending faint   Associated symptoms: headaches, tinnitus, weakness and light-headedness.no palpitations.  Aggravated by:  Getting up  Treatments tried:  Nothing  Improvements on treatment:  No relief      Constitution: Negative.   HENT: Positive for tinnitus.    Cardiovascular: Negative.  Negative for palpitations.   Respiratory: " Negative.    Gastrointestinal: Negative.    Endocrine: negative.   Genitourinary: Negative.  Negative for frequency and urgency.   Musculoskeletal: Negative.    Skin: Negative.    Allergic/Immunologic: Negative.    Neurological: Positive for dizziness, light-headedness and headaches.   Hematologic/Lymphatic: Negative.    Psychiatric/Behavioral: Negative.        Objective:      Physical Exam   Constitutional: She is oriented to person, place, and time. She appears well-developed. She is cooperative.  Non-toxic appearance. She does not appear ill. No distress.   HENT:   Head: Normocephalic and atraumatic.   Ears:   Right Ear: Hearing, tympanic membrane, external ear and ear canal normal.   Left Ear: Hearing, tympanic membrane, external ear and ear canal normal.   Nose: Nose normal. No mucosal edema, rhinorrhea or nasal deformity. No epistaxis. Right sinus exhibits no maxillary sinus tenderness and no frontal sinus tenderness. Left sinus exhibits no maxillary sinus tenderness and no frontal sinus tenderness.   Mouth/Throat: Uvula is midline, oropharynx is clear and moist and mucous membranes are normal. No trismus in the jaw. Normal dentition. No uvula swelling. No oropharyngeal exudate, posterior oropharyngeal edema or posterior oropharyngeal erythema.   Eyes: Conjunctivae and lids are normal. No scleral icterus.   Neck: Trachea normal and phonation normal. Neck supple. No edema present. No erythema present. No neck rigidity present.   Cardiovascular: Normal rate, regular rhythm, normal heart sounds and normal pulses.   Pulmonary/Chest: Effort normal and breath sounds normal. No respiratory distress. She has no decreased breath sounds. She has no rhonchi.   Abdominal: Normal appearance.   Musculoskeletal: Normal range of motion.         General: No deformity. Normal range of motion.   Neurological: no focal deficit. She is alert and oriented to person, place, and time. She has normal motor skills, normal sensation and  intact cranial nerves. She exhibits normal muscle tone. Coordination normal. Coordination normal.   Skin: Skin is warm, dry, intact, not diaphoretic and not pale.   Psychiatric: Her speech is normal and behavior is normal. Judgment and thought content normal.   Nursing note and vitals reviewed.           Results for orders placed or performed in visit on 05/25/22   POCT Glucose, Hand-Held Device   Result Value Ref Range    POC Glucose 116 (A) 70 - 110 MG/DL       Assessment:       1. Syncope, unspecified syncope type          Plan:       Patient Instructions    Discussed results/diagnosis/plan in depth with patient in clinic. Strict precautions given to patient to monitor for worsening signs and symptoms. Advised to follow up with primary.All questions answered. Strict ER precautions given. If your symptoms worsens or fail to improve you should go to the Emergency Room. Discharge and follow-up instructions given verbally/printed. Discharge and follow-up instructions discussed with the patient who expressed understanding and willingness to comply with my recommendations.Patient voiced understanding and in agreement with current treatment plan.              Syncope, unspecified syncope type  -     EKG 12-lead  -     POCT Glucose, Hand-Held Device    outside records reviewed       Medical Decision Making:   Differential Diagnosis:   Thyroid  Syncope unknown  Orthostatic hypotension  Arrhythmia  Vertigo  Vaso vagal      Clinical Tests:   Medical Tests: Ordered  Urgent Care Management:  Ekg with nsr  Orthostatics with change in systolic pressure only from lying to standing  accucheck 116

## 2022-05-25 NOTE — PATIENT INSTRUCTIONS
Discussed results/diagnosis/plan in depth with patient in clinic. Strict precautions given to patient to monitor for worsening signs and symptoms. Advised to follow up with primary.All questions answered. Strict ER precautions given. If your symptoms worsens or fail to improve you should go to the Emergency Room. Discharge and follow-up instructions given verbally/printed. Discharge and follow-up instructions discussed with the patient who expressed understanding and willingness to comply with my recommendations.Patient voiced understanding and in agreement with current treatment plan.

## 2023-04-04 ENCOUNTER — PATIENT MESSAGE (OUTPATIENT)
Dept: RESEARCH | Facility: HOSPITAL | Age: 73
End: 2023-04-04
Payer: MEDICAID